# Patient Record
(demographics unavailable — no encounter records)

---

## 2025-05-28 NOTE — ASSESSMENT
[FreeTextEntry1] : 05/27/2025:   Patient did not have to urinate for uroflow and PVR.   Recommended to get PSA, BMP, urinalysis, and urine culture.   Will inform results.  Patient's last PSA was 0.96 on 01/23/2023.   Return to office in 6 months or sooner if there are any issues. Will do uroflow and check PVR.

## 2025-05-28 NOTE — HISTORY OF PRESENT ILLNESS
[FreeTextEntry1] : Primary care physician: Dr. Monica Hughes, [facility].  Follow my health: Active user.   05/27/2025: 66-year-old male presents for follow-up after a long time.   Not on any prostate or bladder medications.  Reports reasonable stream.   Daytime frequency 4-5 times and nocturia 0 times.   Patient is not bothered with urination at this time.   Has some ejaculation. However, decreased.  No other issues with maintaining, attaining, and penetration with them erections.  Patient follows with Dr. Trejo, who is with [facility].  No new labs.    65-year-old male presents for follow up.  Stopped taking Alfuzosin, has good flow, daytime frequency every 2-4 hours and no nocturia. Denies hesitancy, straining, intermittency, urgency, incontinence or sense of incomplete emptying.  Denies dysuria, hematuria, lower abdominal or flank pain, nausea, vomiting, fever, chills or rigors.  Has improved ejaculation than before but still bothered decreased ejaculation.   Renal and bladder ultrasound 1/29/2024: No renal mass, hydronephrosis or calculi. 6.4 cm simple right mid pole exophytic renal cyst. 3.2 cm left parapelvic renal cysts. Pre void volume: 625 ml. Postvoid volume: 194 ml. Prostate volume: 14 cc.  Status post Rezum: Transurethral water vapor thermotherapy on 6/22/23 at [facility].   Seen on 1/24/23 for lower urinary tract symptoms.   Reported variable stream, urinates every 2 to 3 hours or so during the day. Nocturia of 0 to 1 x.  Endorsed morning sense of incomplete emptying and has to void 2 x 15 minutes.  Denied hesitancy, straining, intermittency, urgency, incontinence, sense of incomplete emptying. Denied dysuria, hematuria, lower abdominal or flank pain, fever, chills or rigors. Normal libido, erections and ejaculation. Not sexually active.  No family history of Prostate cancer or genitourinary malignancy. Nonsmoker. Taking Amitriptyline and Trazodone since April 2022.

## 2025-05-28 NOTE — END OF VISIT
[Time Spent: ___ minutes] : I have spent [unfilled] minutes of time on the encounter which excludes teaching and separately reported services. [FreeTextEntry3] : Parts of this note were generated by using [employer] and Dragon medical dictation software. A reasonable effort was made to proofread and correct its contents, but typos and mistakes may still remain. If there are any questions or points of clarification needed, please contact my office.

## 2025-05-28 NOTE — END OF VISIT
[Time Spent: ___ minutes] : I have spent [unfilled] minutes of time on the encounter which excludes teaching and separately reported services. [FreeTextEntry3] : Parts of this note were generated by using [employer] and Dragon medical dictation software. A reasonable effort was made to proofread and correct its contents, but typos and mistakes may still remain. If there are any questions or points of clarification needed, please contact my office.

## 2025-05-28 NOTE — END OF VISIT
[Time Spent: ___ minutes] : I have spent [unfilled] minutes of time on the encounter which excludes teaching and separately reported services. [FreeTextEntry3] : Parts of this note were generated by using [employer] and Dragon medical dictation software. A reasonable effort was made to proofread and correct its contents, but typos and mistakes may still remain. If there are any questions or points of clarification needed, please contact my office.

## 2025-05-28 NOTE — ADDENDUM
[FreeTextEntry1] :  Brittni MENDIOLA assisted in documentation on 05/28/2025  acting as a scribe for Dr. Mando Butler.

## 2025-05-28 NOTE — HISTORY OF PRESENT ILLNESS
[FreeTextEntry1] : Primary care physician: Dr. Monica Hughes, [facility].  Follow my health: Active user.   05/27/2025: 66-year-old male presents for follow-up after a long time.   Not on any prostate or bladder medications.  Reports reasonable stream.   Daytime frequency 4-5 times and nocturia 0 times.   Patient is not bothered with urination at this time.   Has some ejaculation. However, decreased.  No other issues with maintaining, attaining, and penetration with them erections.  Patient follows with Dr. Trejo, who is with [facility].  No new labs.    65-year-old male presents for follow up.  Stopped taking Alfuzosin, has good flow, daytime frequency every 2-4 hours and no nocturia. Denies hesitancy, straining, intermittency, urgency, incontinence or sense of incomplete emptying.  Denies dysuria, hematuria, lower abdominal or flank pain, nausea, vomiting, fever, chills or rigors.  Has improved ejaculation than before but still bothered decreased ejaculation.   Renal and bladder ultrasound 1/29/2024: No renal mass, hydronephrosis or calculi. 6.4 cm simple right mid pole exophytic renal cyst. 3.2 cm left parapelvic renal cysts. Pre void volume: 625 ml. Postvoid volume: 194 ml. Prostate volume: 14 cc.  Status post Rezum: Transurethral water vapor thermotherapy on 6/22/23 at [facility].   Seen on 1/24/23 for lower urinary tract symptoms.   Reported variable stream, urinates every 2 to 3 hours or so during the day. Nocturia of 0 to 1 x.  Endorsed morning sense of incomplete emptying and has to void 2 x 15 minutes.  Denied hesitancy, straining, intermittency, urgency, incontinence, sense of incomplete emptying. Denied dysuria, hematuria, lower abdominal or flank pain, fever, chills or rigors. Normal libido, erections and ejaculation. Not sexually active.  No family history of Prostate cancer or genitourinary malignancy. Nonsmoker. Taking Amitriptyline and Trazodone since April 2022.

## 2025-05-28 NOTE — HISTORY OF PRESENT ILLNESS
[FreeTextEntry1] : Primary care physician: Dr. Monica Hughes, [facility].  Follow my health: Active user.   05/27/2025: 66-year-old male presents for follow-up after a long time.   Not on any prostate or bladder medications.  Reports reasonable stream.   Daytime frequency 4-5 times and nocturia 0 times.   Patient is not bothered with urination at this time.   Has some ejaculation. However, decreased.  No other issues with maintaining, attaining, and penetration with them erections.  Patient follows with Dr. Trejo, who is with [facility].  No new labs.    65-year-old male presents for follow up.  Stopped taking Alfuzosin, has good flow, daytime frequency every 2-4 hours and no nocturia. Denies hesitancy, straining, intermittency, urgency, incontinence or sense of incomplete emptying.  Denies dysuria, hematuria, lower abdominal or flank pain, nausea, vomiting, fever, chills or rigors.  Has improved ejaculation than before but still bothered decreased ejaculation.   Renal and bladder ultrasound 1/29/2024: No renal mass, hydronephrosis or calculi. 6.4 cm simple right mid pole exophytic renal cyst. 3.2 cm left parapelvic renal cysts. Pre void volume: 625 ml. Postvoid volume: 194 ml. Prostate volume: 14 cc.  Status post Rezum: Transurethral water vapor thermotherapy on 6/22/23 at [facility].   Seen on 1/24/23 for lower urinary tract symptoms.   Reported variable stream, urinates every 2 to 3 hours or so during the day. Nocturia of 0 to 1 x.  Endorsed morning sense of incomplete emptying and has to void 2 x 15 minutes.  Denied hesitancy, straining, intermittency, urgency, incontinence, sense of incomplete emptying. Denied dysuria, hematuria, lower abdominal or flank pain, fever, chills or rigors. Normal libido, erections and ejaculation. Not sexually active.  No family history of Prostate cancer or genitourinary malignancy. Nonsmoker. Taking Amitriptyline and Trazodone since April 2022.
